# Patient Record
Sex: FEMALE | Race: WHITE | Employment: FULL TIME | URBAN - METROPOLITAN AREA
[De-identification: names, ages, dates, MRNs, and addresses within clinical notes are randomized per-mention and may not be internally consistent; named-entity substitution may affect disease eponyms.]

---

## 2022-11-01 ENCOUNTER — OFFICE VISIT (OUTPATIENT)
Dept: ORTHOPEDIC SURGERY | Age: 62
End: 2022-11-01
Payer: COMMERCIAL

## 2022-11-01 DIAGNOSIS — M25.532 LEFT WRIST PAIN: Primary | ICD-10-CM

## 2022-11-01 PROCEDURE — 99203 OFFICE O/P NEW LOW 30 MIN: CPT | Performed by: NURSE PRACTITIONER

## 2022-11-01 RX ORDER — ALBUTEROL SULFATE 90 UG/1
2 AEROSOL, METERED RESPIRATORY (INHALATION) EVERY 6 HOURS PRN
COMMUNITY
Start: 2021-07-22

## 2022-11-01 RX ORDER — LORATADINE 10 MG/1
TABLET ORAL DAILY
COMMUNITY

## 2022-11-01 RX ORDER — IRBESARTAN 75 MG/1
TABLET ORAL DAILY
COMMUNITY
Start: 2022-05-13

## 2022-11-01 NOTE — PROGRESS NOTES
Orthopaedic Hand Clinic Note    Name: Martha Isabel  Age: 60 y.o. YOB: 1960   Gender: female  MRN: [id number]      CC: Patient referred for evaluation of left wrist injury    HPI: Martha Isabel is a 60 y.o. female right handed with a chief complaint of   left  wrist pain. The injury occurred 4 weeks ago, on September 29, 2022,  When the patient fell at home landing on outstretched left hand. . The pain is located diffusely about the left wrist. Denies numbness or paresthesias of the fingers. Evaluation at urgent care has included x-rays. Treatment to date has included Velcro wrist splint. Denies loss of consciousness, head injury or neck pain. She went out of town for work. She is just now following up. She complains of some pain with gripping, twisting, and lifting. ROS/Meds/PSH/PMH/FH/SH: I personally reviewed the patients standard intake form. Pertinents are discussed in the HPI    Physical Examination:  General: Awake and alert. HEENT: Normocephalic, atraumatic  CV/Pulm: Breathing even and unlabored  Skin: No obvious rashes noted. Lymphatic: No obvious evidence of lymphedema or lymphadenopathy    Musculoskeletal Exam:  Examination of the left upper extremity demonstrates no open wounds. Negative Tinel's over left carpal tunnel. Sensation is intact throughout, cap refill in all fingers < 5 seconds. Finger motion is limited with mild swelling of the hand and fingers. Tenderness over the distal radius. No  tenderness over the ulnar aspect of the wrist. No tenderness at elbow, shoulder, clavicle or cervical spine. Imaging / Electrodiagnostic Tests:     XR of the left wrist 3 views, demonstrates a healing nondisplaced extra-articular distal radius fracture     Assessment:   1. Left wrist pain        Plan:   We discussed the diagnosis and different treatment options.  We discussed observation, casting, further imaging, and surgical fixation and the risks, benefits and alternatives of all these options. After discussing in detail the patient elects to proceed with continuing with the wrist splint for 2 more weeks. We have discussed activity restrictions in detail such as no heavy lifting, pushing, pulling, or pushing up from a chair. I will see her back in 4 weeks with a new x-ray. .     Patient voiced accordance and understanding of the information provided and the formulated plan. All questions were answered to the patient's satisfaction during the encounter.     Treatment at this time: wrist Splint and activity restrictions    SADAF Stone - CNP  Orthopaedic Surgery  11/01/22  3:30 PM

## 2022-12-05 ENCOUNTER — OFFICE VISIT (OUTPATIENT)
Dept: ORTHOPEDIC SURGERY | Age: 62
End: 2022-12-05
Payer: COMMERCIAL

## 2022-12-05 DIAGNOSIS — S52.552A OTHER EXTRAARTICULAR FRACTURE OF LOWER END OF LEFT RADIUS, INITIAL ENCOUNTER FOR CLOSED FRACTURE: ICD-10-CM

## 2022-12-05 DIAGNOSIS — M25.532 LEFT WRIST PAIN: Primary | ICD-10-CM

## 2022-12-05 PROCEDURE — 99214 OFFICE O/P EST MOD 30 MIN: CPT | Performed by: NURSE PRACTITIONER

## 2022-12-05 NOTE — PROGRESS NOTES
Orthopaedic Hand Clinic Note    Name: Jonathan James  YOB: 1960  Gender: female  MRN: [id number]      Follow up visit:   1. Left wrist pain        HPI: Jonathan James is a 60 y.o. female who is following up for left distal radius fracture. She is 9 and half weeks out from injury. She is weaned out of her wrist brace at night. She notes some mild discomfort along the lateral aspect of her left wrist.  She denies any prior injuries. She also notes weakness and fatigue in her wrist..    ROS/Meds/PSH/PMH/FH/SH: I personally reviewed the patients standard intake form. Pertinents are discussed in the HPI    Physical Examination:    Musculoskeletal Examination:  Examination on the left upper extremity demonstrates cap refill < 5 seconds in all fingers  She has mild swelling to the left wrist.  She is nontender over the left distal radius. She is diffusely tender along the lateral aspect. She is stiff in her range of motion. She denies paresthesia. She is neurovascular intact with good capillary refill. Imaging / Electrodiagnostic Tests:     X-rays include a 3 view left wrist are reviewed. Her fracture is healed. Her ulna is longer than her radius. Assessment:     ICD-10-CM    1. Left wrist pain  M25.532 XR WRIST LEFT (MIN 3 VIEWS)          Plan:   We discussed the diagnosis and different treatment options. We discussed observation, therapy, antiinflammatory medications and other pertinent treatment modalities. After discussing in detail the patient elects to proceed with anti-inflammatories and therapy. She will wean out of the brace completely. I will see her back in 6 weeks. She is told she can cancel that appointment if she is doing well. .     Patient voiced accordance and understanding of the information provided and the formulated plan. All questions were answered to the patient's satisfaction during the encounter.     4 This is an acute complicated injury  Treatment at this time: Prescription medication and therapy    Darylene Genet, APRN - CNP  Orthopaedic Surgery  12/05/22  3:03 PM